# Patient Record
Sex: MALE | Race: WHITE | Employment: FULL TIME | URBAN - METROPOLITAN AREA
[De-identification: names, ages, dates, MRNs, and addresses within clinical notes are randomized per-mention and may not be internally consistent; named-entity substitution may affect disease eponyms.]

---

## 2019-01-28 ENCOUNTER — HOSPITAL ENCOUNTER (EMERGENCY)
Age: 39
Discharge: HOME | End: 2019-01-28
Attending: EMERGENCY MEDICINE
Payer: MEDICARE

## 2019-01-28 ENCOUNTER — APPOINTMENT (OUTPATIENT)
Dept: ULTRASOUND IMAGING | Age: 39
End: 2019-01-28
Payer: MEDICARE

## 2019-01-28 VITALS
RESPIRATION RATE: 16 BRPM | HEIGHT: 65 IN | BODY MASS INDEX: 24.99 KG/M2 | OXYGEN SATURATION: 100 % | TEMPERATURE: 98.7 F | SYSTOLIC BLOOD PRESSURE: 128 MMHG | HEART RATE: 78 BPM | DIASTOLIC BLOOD PRESSURE: 80 MMHG | WEIGHT: 150 LBS

## 2019-01-28 DIAGNOSIS — K40.90 RIGHT INGUINAL HERNIA: Primary | ICD-10-CM

## 2019-01-28 LAB
BILIRUBIN URINE: NEGATIVE
BLOOD, URINE: NEGATIVE
CLARITY: CLEAR
COLOR: NORMAL
GLUCOSE URINE: NEGATIVE MG/DL
KETONES, URINE: NEGATIVE MG/DL
LEUKOCYTE ESTERASE, URINE: NEGATIVE
MICROSCOPIC EXAMINATION: NORMAL
NITRITE, URINE: NEGATIVE
PH UA: 7.5
PROTEIN UA: NEGATIVE MG/DL
SPECIFIC GRAVITY UA: 1.01
URINE REFLEX TO CULTURE: NORMAL
URINE TYPE: NORMAL
UROBILINOGEN, URINE: 0.2 E.U./DL

## 2019-01-28 PROCEDURE — 99284 EMERGENCY DEPT VISIT MOD MDM: CPT

## 2019-01-28 PROCEDURE — 76870 US EXAM SCROTUM: CPT

## 2019-01-28 PROCEDURE — 81003 URINALYSIS AUTO W/O SCOPE: CPT

## 2019-01-28 ASSESSMENT — PAIN SCALES - GENERAL: PAINLEVEL_OUTOF10: 3

## 2019-01-28 ASSESSMENT — PAIN DESCRIPTION - ORIENTATION: ORIENTATION: RIGHT

## 2019-01-28 ASSESSMENT — PAIN DESCRIPTION - PAIN TYPE: TYPE: ACUTE PAIN

## 2019-01-28 ASSESSMENT — PAIN DESCRIPTION - LOCATION: LOCATION: GROIN

## 2019-01-31 ASSESSMENT — ENCOUNTER SYMPTOMS
EYE REDNESS: 0
RHINORRHEA: 0
SHORTNESS OF BREATH: 0
BACK PAIN: 0
ABDOMINAL PAIN: 0
DIARRHEA: 0
EYE PAIN: 0
NAUSEA: 0
CONSTIPATION: 0
SORE THROAT: 0
COUGH: 0
FACIAL SWELLING: 0
CHEST TIGHTNESS: 0

## 2019-02-05 ENCOUNTER — INITIAL CONSULT (OUTPATIENT)
Dept: SURGERY | Age: 39
End: 2019-02-05
Payer: MEDICAID

## 2019-02-05 VITALS
SYSTOLIC BLOOD PRESSURE: 118 MMHG | BODY MASS INDEX: 27.82 KG/M2 | WEIGHT: 167 LBS | DIASTOLIC BLOOD PRESSURE: 70 MMHG | HEIGHT: 65 IN

## 2019-02-05 DIAGNOSIS — R10.31 RIGHT GROIN PAIN: Primary | ICD-10-CM

## 2019-02-05 PROCEDURE — 99242 OFF/OP CONSLTJ NEW/EST SF 20: CPT | Performed by: SURGERY

## 2021-01-27 ENCOUNTER — HOSPITAL ENCOUNTER (EMERGENCY)
Age: 41
Discharge: HOME | End: 2021-01-27
Payer: COMMERCIAL

## 2021-01-27 ENCOUNTER — APPOINTMENT (OUTPATIENT)
Dept: GENERAL RADIOLOGY | Age: 41
End: 2021-01-27
Payer: COMMERCIAL

## 2021-01-27 VITALS
SYSTOLIC BLOOD PRESSURE: 122 MMHG | OXYGEN SATURATION: 98 % | BODY MASS INDEX: 27.82 KG/M2 | HEIGHT: 65 IN | TEMPERATURE: 98 F | WEIGHT: 167 LBS | DIASTOLIC BLOOD PRESSURE: 70 MMHG | RESPIRATION RATE: 18 BRPM | HEART RATE: 77 BPM

## 2021-01-27 DIAGNOSIS — R07.9 CHEST PAIN, UNSPECIFIED TYPE: Primary | ICD-10-CM

## 2021-01-27 LAB
A/G RATIO: 1.3 (ref 1.1–2.2)
ALBUMIN SERPL-MCNC: 4.4 G/DL (ref 3.4–5)
ALP BLD-CCNC: 117 U/L (ref 40–129)
ALT SERPL-CCNC: 57 U/L (ref 10–40)
ANION GAP SERPL CALCULATED.3IONS-SCNC: 9 MMOL/L (ref 3–16)
AST SERPL-CCNC: 37 U/L (ref 15–37)
BASOPHILS ABSOLUTE: 0.1 K/UL (ref 0–0.2)
BASOPHILS RELATIVE PERCENT: 1.7 %
BILIRUB SERPL-MCNC: <0.2 MG/DL (ref 0–1)
BUN BLDV-MCNC: 22 MG/DL (ref 7–20)
CALCIUM SERPL-MCNC: 9.3 MG/DL (ref 8.3–10.6)
CHLORIDE BLD-SCNC: 100 MMOL/L (ref 99–110)
CO2: 26 MMOL/L (ref 21–32)
CREAT SERPL-MCNC: 0.6 MG/DL (ref 0.9–1.3)
EKG ATRIAL RATE: 67 BPM
EKG DIAGNOSIS: NORMAL
EKG P AXIS: 17 DEGREES
EKG P-R INTERVAL: 140 MS
EKG Q-T INTERVAL: 408 MS
EKG QRS DURATION: 92 MS
EKG QTC CALCULATION (BAZETT): 431 MS
EKG R AXIS: 19 DEGREES
EKG T AXIS: 9 DEGREES
EKG VENTRICULAR RATE: 67 BPM
EOSINOPHILS ABSOLUTE: 0.2 K/UL (ref 0–0.6)
EOSINOPHILS RELATIVE PERCENT: 3.3 %
GFR AFRICAN AMERICAN: >60
GFR NON-AFRICAN AMERICAN: >60
GLOBULIN: 3.3 G/DL
GLUCOSE BLD-MCNC: 94 MG/DL (ref 70–99)
HCT VFR BLD CALC: 44.9 % (ref 40.5–52.5)
HEMOGLOBIN: 15.1 G/DL (ref 13.5–17.5)
LACTIC ACID: 1.1 MMOL/L (ref 0.4–2)
LYMPHOCYTES ABSOLUTE: 1.7 K/UL (ref 1–5.1)
LYMPHOCYTES RELATIVE PERCENT: 27 %
MCH RBC QN AUTO: 30.5 PG (ref 26–34)
MCHC RBC AUTO-ENTMCNC: 33.7 G/DL (ref 31–36)
MCV RBC AUTO: 90.6 FL (ref 80–100)
MONOCYTES ABSOLUTE: 0.4 K/UL (ref 0–1.3)
MONOCYTES RELATIVE PERCENT: 6.4 %
NEUTROPHILS ABSOLUTE: 3.9 K/UL (ref 1.7–7.7)
NEUTROPHILS RELATIVE PERCENT: 61.6 %
PDW BLD-RTO: 12.8 % (ref 12.4–15.4)
PLATELET # BLD: 270 K/UL (ref 135–450)
PMV BLD AUTO: 8 FL (ref 5–10.5)
POTASSIUM SERPL-SCNC: 3.9 MMOL/L (ref 3.5–5.1)
RBC # BLD: 4.96 M/UL (ref 4.2–5.9)
SODIUM BLD-SCNC: 135 MMOL/L (ref 136–145)
TOTAL PROTEIN: 7.7 G/DL (ref 6.4–8.2)
TROPONIN: <0.01 NG/ML
WBC # BLD: 6.3 K/UL (ref 4–11)

## 2021-01-27 PROCEDURE — 93005 ELECTROCARDIOGRAM TRACING: CPT | Performed by: EMERGENCY MEDICINE

## 2021-01-27 PROCEDURE — 85025 COMPLETE CBC W/AUTO DIFF WBC: CPT

## 2021-01-27 PROCEDURE — 83605 ASSAY OF LACTIC ACID: CPT

## 2021-01-27 PROCEDURE — 71045 X-RAY EXAM CHEST 1 VIEW: CPT

## 2021-01-27 PROCEDURE — 99285 EMERGENCY DEPT VISIT HI MDM: CPT

## 2021-01-27 PROCEDURE — 84484 ASSAY OF TROPONIN QUANT: CPT

## 2021-01-27 PROCEDURE — 93010 ELECTROCARDIOGRAM REPORT: CPT | Performed by: INTERNAL MEDICINE

## 2021-01-27 PROCEDURE — 6370000000 HC RX 637 (ALT 250 FOR IP): Performed by: NURSE PRACTITIONER

## 2021-01-27 PROCEDURE — 80053 COMPREHEN METABOLIC PANEL: CPT

## 2021-01-27 PROCEDURE — 94640 AIRWAY INHALATION TREATMENT: CPT

## 2021-01-27 RX ORDER — DICYCLOMINE HYDROCHLORIDE 10 MG/1
CAPSULE ORAL PRN
COMMUNITY
Start: 2021-01-22

## 2021-01-27 RX ORDER — DICLOFENAC SODIUM 75 MG/1
TABLET, DELAYED RELEASE ORAL DAILY
COMMUNITY
Start: 2021-01-22

## 2021-01-27 RX ORDER — ASPIRIN 81 MG/1
324 TABLET, CHEWABLE ORAL ONCE
Status: COMPLETED | OUTPATIENT
Start: 2021-01-27 | End: 2021-01-27

## 2021-01-27 RX ORDER — NITROGLYCERIN 0.4 MG/1
0.4 TABLET SUBLINGUAL EVERY 5 MIN PRN
Status: DISCONTINUED | OUTPATIENT
Start: 2021-01-27 | End: 2021-01-27 | Stop reason: HOSPADM

## 2021-01-27 RX ORDER — ALBUTEROL SULFATE 90 UG/1
2 AEROSOL, METERED RESPIRATORY (INHALATION) 4 TIMES DAILY PRN
Qty: 1 INHALER | Refills: 0 | Status: SHIPPED | OUTPATIENT
Start: 2021-01-27

## 2021-01-27 RX ORDER — IPRATROPIUM BROMIDE AND ALBUTEROL SULFATE 2.5; .5 MG/3ML; MG/3ML
1 SOLUTION RESPIRATORY (INHALATION) ONCE
Status: COMPLETED | OUTPATIENT
Start: 2021-01-27 | End: 2021-01-27

## 2021-01-27 RX ADMIN — ASPIRIN 324 MG: 81 TABLET, CHEWABLE ORAL at 13:03

## 2021-01-27 RX ADMIN — IPRATROPIUM BROMIDE AND ALBUTEROL SULFATE 1 AMPULE: .5; 3 SOLUTION RESPIRATORY (INHALATION) at 13:59

## 2021-01-27 ASSESSMENT — PAIN DESCRIPTION - PROGRESSION: CLINICAL_PROGRESSION: NOT CHANGED

## 2021-01-27 ASSESSMENT — PAIN SCALES - GENERAL
PAINLEVEL_OUTOF10: 4
PAINLEVEL_OUTOF10: 4

## 2021-01-27 ASSESSMENT — PAIN DESCRIPTION - DESCRIPTORS: DESCRIPTORS: SQUEEZING

## 2021-01-27 ASSESSMENT — HEART SCORE: ECG: 1

## 2021-01-27 ASSESSMENT — PAIN DESCRIPTION - LOCATION: LOCATION: CHEST

## 2021-01-27 NOTE — ED PROVIDER NOTES
I was not asked to see this patient. I was available for consultation if deemed necessary. I was only asked to interpret the EKG. Please see NP Ke's note for care of the patient while in the emergency department and for final disposition. The Ekg interpreted by me shows  normal sinus rhythm with a rate of 67  Axis is   Normal  QTc is  normal  Intervals and Durations are unremarkable.       ST Segments: nonspecific changes  No significant change from prior EKG dated - no old EKG  No STEMI             [address], MD  01/27/21 1496

## 2021-01-27 NOTE — ED NOTES
Pt ambulatory to restroom at this time. Pt's gait even and steady.      Teena Camacho RN  01/27/21 9529

## 2021-01-27 NOTE — ED PROVIDER NOTES
[facility] Emergency Department    CHIEF COMPLAINT  Chest Pain (patient reporting chest pain x 2 weeks. pt reports he was seen at [facility] and dx with a viral infection (NEGATIVE COVID) pt reports he was told to go to the ED if the chest pain continued. ) and Shortness of [address] is a 47 y.o. male who presents to the ED complaining of chest pain he describes as tightness for the past week. Patient reports he also has associated shortness of breath. Patient was diagnosed with COVID-19 in the beginning of January. Patient denies measurable fever, chills, body aches, cough, mopped assist, dizziness, syncope, abdominal pain, nausea, vomiting, diarrhea, urinary complaints, flank pain, leg swelling, calf pain, palpitations. No other complaints, modifying factors or associated symptoms. Nursing notes reviewed. History reviewed. No pertinent past medical history. Past Surgical History:   Procedure Laterality Date    HERNIA REPAIR       History reviewed. No pertinent family history. Social History     Socioeconomic History    Marital status: Single     Spouse name: Not on file    Number of children: Not on file    Years of education: Not on file    Highest education level: Not on file   Occupational History    Not on file   Social Needs    Financial resource strain: Not on file    Food insecurity     Worry: Not on file     Inability: Not on file    Transportation needs     Medical: Not on file     Non-medical: Not on file   Tobacco Use    Smoking status: Never Smoker    Smokeless tobacco: Current User     Types: Snuff   Substance and Sexual Activity    Alcohol use:  Yes     Alcohol/week: 4.0 standard drinks     Types: 4 Cans of beer per week     Comment: daily     Drug use: No    Sexual activity: Not on file   Lifestyle    Physical activity     Days per week: Not on file     Minutes per session: Not on file    Stress: Not on file   Relationships    Social connections     Talks on phone: Not on file     Gets together: Not on file     Attends Sabianist service: Not on file     Active member of club or organization: Not on file     Attends meetings of clubs or organizations: Not on file     Relationship status: Not on file    Intimate partner violence     Fear of current or ex partner: Not on file     Emotionally abused: Not on file     Physically abused: Not on file     Forced sexual activity: Not on file   Other Topics Concern    Not on file   Social History Narrative    Not on file     Current Facility-Administered Medications   Medication Dose Route Frequency Provider Last Rate Last Admin    nitroGLYCERIN (NITROSTAT) SL tablet 0.4 mg  0.4 mg Sublingual Q5 Min PRN Simmie Chew, APRN - CNP         Current Outpatient Medications   Medication Sig Dispense Refill    albuterol sulfate HFA (VENTOLIN HFA) 108 (90 Base) MCG/ACT inhaler Inhale 2 puffs into the lungs 4 times daily as needed for Wheezing 1 Inhaler 0    dicyclomine (BENTYL) 10 MG capsule       diclofenac (VOLTAREN) 75 MG EC tablet        Allergies   Allergen Reactions    Amoxicillin Anaphylaxis    Penicillins Anaphylaxis       REVIEW OF SYSTEMS  10 systems reviewed, pertinent positives per HPI otherwise noted to be negative    PHYSICAL EXAM  /70   Pulse 77   Temp 98 °F (36.7 °C) (Oral)   Resp 18   Ht 5' 5\" (1.651 m)   Wt 167 lb (75.8 kg)   SpO2 98%   BMI 27.79 kg/m²   GENERAL APPEARANCE: Awake and alert. Cooperative. No acute distress. Vital signs are stable. Well appearing and non toxic. HEAD: Normocephalic. Atraumatic. EYES: PERRL. EOM's grossly intact. ENT: Mucous membranes are moist.   NECK: Supple. Normal ROM. HEART: RRR. Distal pulses are equal and intact. Cap refill less than 2 seconds. LUNGS: Respirations unlabored. CTAB. Good air exchange. Speaking comfortably in full sentences.  No wheezing, rhonchi, rales, stridor. ABDOMEN: Soft. Non-distended. Non-tender. No guarding or rebound. No masses. No organomegaly. No rigidity. Bowel sounds are present. Negative lyons's. Negative McBurney's point. Negative CVA tenderness. EXTREMITIES: No peripheral edema. Moves all extremities equally. All extremities neurovascularly intact. SKIN: Warm and dry. No acute rashes. NEUROLOGICAL: Alert and oriented. No gross facial drooping. Strength 5/5, sensation intact. PSYCHIATRIC: Normal mood and affect. SCREENINGS        Heart Score  Heart Score for chest pain patients  History: Slightly Suspicious  ECG: Non-Specifc repolarization disturbance/LBTB/PM  Patient Age: < 45 years  *Risk factors for Atherosclerotic disease: Positive family History  Risk Factors: 1 or 2 risk factors  Troponin: < 1X normal limit  Heart Score Total: 2     PERC Rule:  Applicable in this patient who has low clinical suspicion for pulmonary embolism. Age < 48years old: Yes  Heart rate < 100 bpm: Yes  Oxygen saturation > 95%: Yes  Hemoptysis: No  Exogenous estrogen use: No  Prior history of DVT or PE: No  Unilateral leg swelling: No  Surgery or significant trauma in the past 4 weeks: No    Based on the above, PE can effectively be ruled out without further testing. RADIOLOGY  Xr Chest Portable    Result Date: 1/27/2021  EXAMINATION: ONE X-RAY VIEW OF THE CHEST 1/27/2021 12:05 pm COMPARISON: None HISTORY: ORDERING SYSTEM PROVIDED HISTORY: QUINCY MILLER TECHNOLOGIST PROVIDED HISTORY: Reason for exam:   PAUL, QUINCY FINDINGS: The cardiomediastinal silhouette is normal in size. Lung volumes are low. The lungs are clear. No pleural effusion or pneumothorax is present. Low lung volumes without evidence of acute cardiopulmonary process. ED COURSE/MDM  Patient seen and evaluated. Old records reviewed. Diagnostic testing reviewed and results discussed. I have independently evaluated this patient based upon my scope of practice.  Supervising physician was in the department for consultation as needed. Francine Ellington presented to the ED today with above noted complaints. Emergency department course is unremarkable chest x-ray shows low lung volumes no evidence of acute cardiopulmonary process. Troponin less than 0.01. CBC and CMP show no leukocytosis, bandemia, anemia, acute kidney injury, electrolyte abnormality. Lactic acid 1.1. EKG was interpreted by my attending physician, no ST elevation. Patient was given a DuoNeb and aspirin with good relief of chest tightness. While in ED patient received   Medications   nitroGLYCERIN (NITROSTAT) SL tablet 0.4 mg (0.4 mg Sublingual Not Given 1/27/21 1450)   aspirin chewable tablet 324 mg (324 mg Oral Given 1/27/21 1303)   ipratropium-albuterol (DUONEB) nebulizer solution 1 ampule (1 ampule Inhalation Given 1/27/21 1359)             At this point I do not feel the patient requires further work up and it is reasonable to discharge the patient. A discussion was had with the patient and/or their surrogate regarding diagnosis, diagnostic testing results, treatment/ plan of care, and follow up. There was shared decision-making between myself as well as the patient and/or their surrogate and we are all in agreement with discharge home. There was an opportunity for questions and all questions were answered to the best of my ability and to the satisfaction of the patient and/or patient family. Patient will follow up with pcp for further evaluation/treatment. The patient was given strict return precautions as we discussed symptoms that would necessitate return to the ED. Patient will return to ED for new/worsening symptoms. The patient verbalized their understanding and agreement with the above plan. Please refer to AVS for further details regarding discharge instructions.       Results for orders placed or performed during the hospital encounter of 01/27/21   CBC auto differential   Result THORACIC AORTIC DISSECTION, thus I consider the discharge disposition reasonable. Radha Rubalcava and I have discussed the diagnosis and risks, and we agree with discharging home to follow-up with their primary doctor. We also discussed returning to the Emergency Department immediately if new or worsening symptoms occur. We have discussed the symptoms which are most concerning (e.g., bloody sputum, fever, worsening pain or shortness of breath, vomiting) that necessitate immediate return. FINAL Impression    1. Chest pain, unspecified type        Blood pressure 122/70, pulse 77, temperature 98 °F (36.7 °C), temperature source Oral, resp. rate 18, height 5' 5\" (1.651 m), weight 167 lb (75.8 kg), SpO2 98 %. Patient was sent home with a prescription for below medication/s. I did [facility] [place] patient on appropriate use of these medication. Discharge Medication List as of 1/27/2021  2:40 PM      START taking these medications    Details   albuterol sulfate HFA (VENTOLIN HFA) 108 (90 Base) MCG/ACT inhaler Inhale 2 puffs into the lungs 4 times daily as needed for Wheezing, Disp-1 Inhaler, R-0Normal                 FOLLOW UP  [facility]  [facility] [address] Pre[facility]Services  [address]  ED  [address]          DISPOSITION  Patient was discharged to home in good condition. Comment: Please note this report has been produced using speech recognition software and may contain errors related to that system including errors in grammar, punctuation, and spelling, as well as words and phrases that may be inappropriate. If there are any questions or concerns please feel free to contact the dictating provider for clarification.             Rosi Gupta, APRN - CNP  01/27/21 7047

## 2021-01-27 NOTE — ED NOTES
Educated pt on x1 prescriptions and discharge paperwork as well as follow-up appointment. Pt verbalizes understanding of all instructions and denies questions. IV discontinued, catheter intact, minimal bleeding at IV site, 2x2 and tape applied, manual pressure held. Pt left ambulatory by self with all personal belongings, and discharge paperwork to private residence. Pt in no distress at this time.        Cody Ramos RN  01/27/21 0643

## 2021-01-29 ENCOUNTER — TELEPHONE (OUTPATIENT)
Dept: CARDIOLOGY CLINIC | Age: 41
End: 2021-01-29

## 2021-01-29 ENCOUNTER — OFFICE VISIT (OUTPATIENT)
Dept: CARDIOLOGY CLINIC | Age: 41
End: 2021-01-29
Payer: COMMERCIAL

## 2021-01-29 VITALS
HEART RATE: 75 BPM | BODY MASS INDEX: 27.16 KG/M2 | SYSTOLIC BLOOD PRESSURE: 110 MMHG | WEIGHT: 169 LBS | OXYGEN SATURATION: 98 % | HEIGHT: 66 IN | DIASTOLIC BLOOD PRESSURE: 60 MMHG

## 2021-01-29 DIAGNOSIS — R53.83 FATIGUE, UNSPECIFIED TYPE: ICD-10-CM

## 2021-01-29 DIAGNOSIS — R00.2 PALPITATIONS: ICD-10-CM

## 2021-01-29 DIAGNOSIS — R07.89 ATYPICAL CHEST PAIN: Primary | ICD-10-CM

## 2021-01-29 DIAGNOSIS — U07.1 COVID-19: ICD-10-CM

## 2021-01-29 DIAGNOSIS — Z87.891 FORMER TOBACCO USE: ICD-10-CM

## 2021-01-29 DIAGNOSIS — R06.00 DYSPNEA, UNSPECIFIED TYPE: ICD-10-CM

## 2021-01-29 PROBLEM — R07.2 PRECORDIAL PAIN: Status: ACTIVE | Noted: 2021-01-29

## 2021-01-29 PROBLEM — R06.02 SHORTNESS OF BREATH: Status: ACTIVE | Noted: 2021-01-29

## 2021-01-29 PROCEDURE — 99244 OFF/OP CNSLTJ NEW/EST MOD 40: CPT | Performed by: INTERNAL MEDICINE

## 2021-01-29 PROCEDURE — 93246 EXT ECG>7D<15D RECORDING: CPT | Performed by: INTERNAL MEDICINE

## 2021-01-29 ASSESSMENT — ENCOUNTER SYMPTOMS
EYE PAIN: 0
RHINORRHEA: 0
BLOOD IN STOOL: 0
COUGH: 1
SORE THROAT: 0
NAUSEA: 1
SHORTNESS OF BREATH: 1
CONSTIPATION: 0
VOMITING: 0
DIARRHEA: 1
PHOTOPHOBIA: 0
ABDOMINAL PAIN: 0

## 2021-01-29 NOTE — PATIENT INSTRUCTIONS
Patient Plan:  1. Check TSH, magnesium, lipids, Hemoglobin A1C   2. Echocardiogram to evaluate heart function and structure. 3. Stress echocardiogram.   4. 2 week cardiac event monitor to visualize your heart rhythm associated to your palpitations. 5. Follow up with me in 1 months. Your provider has ordered testing for further evaluation. An order/prescription has been included in your paper work. ? To schedule outpatient testing, contact Central Scheduling by calling [address] [place] [address] ([phone]).

## 2021-01-29 NOTE — TELEPHONE ENCOUNTER
Pt sts he trying to get short term disability. Pt was told by insurance company that he had to reach out to our office to get records released to them [facility]. Pt wants to know what he needs to get records released.

## 2021-01-29 NOTE — LETTER
[address] Cardiology - [address] [facility] [address] [address]  Phone: [phone]  Fax: [phone]    Rainer Abdi DO        January 29, 2021     Mami Acosta  [address] [facility] [address].      To whom it may concern,     Mami Acosta 1980 was recently evaluated in the emergency room and is now being evaluated in our cardiologist office. It is recommended that he remain off of work until  2/5/2021 to further recover. If you have any questions or concerns, please don't hesitate to call.     Sincerely,        Chase Hernandez, DO

## 2021-01-29 NOTE — PROGRESS NOTES
[address] [facility]   Cardiovascular Evaluation    PATIENT: Rosanne Spangler  DATE: 2021  MRN: <[id number]>  CSN: [id number]  : 1980      Primary Care Doctor: No primary care provider on file. Reason for evaluation:   Chest pain, shortness of breath, palpitations    Subjective:   History of present illness on initial date of evaluation:  Dana ViviennemanOlga Godron is a 45-year-old male with a history of tobacco use referred for further evaluation of chest pain and shortness of breath. The patient reports that he developed fatigue, shortness of breath, and a productive cough around [place] and tested positive for COVID-19 on 20. A couple weeks after testing positive, he starting having substernal chest pressure which he says has been constant. The pressure seems to get worse when he exerts himself and is also associated with shortness of breath. He also notices that it seems to become more severe when he coughs or when he lays on his right side. He does not notice a difference when he lays flat on his back or leans forward. He says that he continues to feel weak since being diagnosed with COVID and gets easily fatigued. He has had some intermittent diarrhea with nausea and dry heaving. He has been having chills, but no fevers. He has also had some mild dizziness with standing and has had episodes where he feels like his heart is racing and beating irregularly. His palpitations have been occurring 5-6 times per day. He denies any syncope, lower extremity edema, or orthopnea. He was seen in the ED for chest pain two days ago. At that time, troponin was negative and ECG showed normal sinus rhythm with RSR' pattern in lead V1, suggestive of possible right ventricular conduction delay, but otherwise no ischemic changes. Chest x-ray was negative for any acute cardiopulmonary process. He as subsequently discharged home, but has continued to have symptoms. Additionally, the patient reports that he smoked 1-1.5 ppd for 7 years and quit about 16 years ago. He drinks alcohol socially, but denies any illicit drug use. He does admit to drinking a lot of caffeine and says that he consumes 3-4 cans of pop per day. Family history is notable for CAD in his father who had a 4-vessel CABG in his 46s. His paternal grandfather  of an MI at age 79. He also has a paternal uncle how had an MI in his 45s. Patient Active Problem List   Diagnosis    Precordial pain    Shortness of breath    Palpitations       Past Medical History:  1. Tobacco use    Surgical History:  No significant surgical history. Social History:   reports that he has never smoked. His smokeless tobacco use includes snuff. He reports current alcohol use of about 4.0 standard drinks of alcohol per week. He reports that he does not use drugs. Family History:  Father - CAD, 4-vessel CABG in his 46s  Paternal grandfather - CAD,  of MI at age 79  Paternal uncle - CAD, had MI in 45s    Home Medications:  Reviewed and are listed in nursing record. and/or listed below  Current Outpatient Medications   Medication Sig Dispense Refill    dicyclomine (BENTYL) 10 MG capsule Take by mouth as needed       diclofenac (VOLTAREN) 75 MG EC tablet Take by mouth daily       albuterol sulfate HFA (VENTOLIN HFA) 108 (90 Base) MCG/ACT inhaler Inhale 2 puffs into the lungs 4 times daily as needed for Wheezing 1 Inhaler 0     No current facility-administered medications for this visit. Allergies:  Amoxicillin and Penicillins     Review of Systems:   A 14 point review of symptoms completed. Pertinent positives identified in the HPI, all other review of symptoms negative as below. Review of Systems   Constitutional: Positive for chills and fatigue. Negative for fever. HENT: Negative for congestion, rhinorrhea and sore throat. Eyes: Positive for visual disturbance. Negative for photophobia and pain. Respiratory: Positive for cough and shortness of breath. Cardiovascular: Positive for chest pain and palpitations. Negative for leg swelling. Gastrointestinal: Positive for diarrhea and nausea. Negative for abdominal pain, blood in stool, constipation and vomiting. Endocrine: Negative for cold intolerance and heat intolerance. Genitourinary: Negative for difficulty urinating, dysuria and hematuria. Musculoskeletal: Negative for arthralgias, joint swelling and myalgias. Skin: Negative for rash and wound. Allergic/Immunologic: Negative for environmental allergies and food allergies. Neurological: Positive for dizziness, weakness and numbness. Negative for syncope. Hematological: Negative for adenopathy. Does not bruise/bleed easily. Psychiatric/Behavioral: Negative for dysphoric mood. The patient is not nervous/anxious. Objective:   PHYSICAL EXAM:    Vitals:    01/29/21 1102   BP: 110/60   Pulse: 75   SpO2: 98%    Weight: 169 lb (76.7 kg)     Wt Readings from Last 3 Encounters:   01/29/21 169 lb (76.7 kg)   01/27/21 167 lb (75.8 kg)   02/05/19 167 lb (75.8 kg)     General: Adult male in no acute distress. Pleasant and interactive on exam.  HEENT: Normocephalic, atraumatic, non-icteric, hearing intact, nares normal, mucous membranes moist.  Neck: Supple, trachea midline. No adenopathy. No thyromegaly. No carotid bruits. No JVD. Heart: Regular rate and rhythm. Normal S1 and S2. No murmurs, gallops or rubs. Lungs: Normal respiratory effort. Clear to auscultation bilaterally. No wheezes, rales, or rhonchi. Abdomen: Soft, non-tender. Normoactive bowel sounds. No masses or organomegaly. Skin: No rashes, wounds, or lesions. Pulses: 2+ and symmetric. Extremities: No clubbing, cyanosis, or edema. Musculoskeletal: 5/5 strength in upper and lower extremities. Psych: Normal mood and affect. Neuro: Alert and oriented to person, place, and time. No focal deficits noted. LABS   CBC:      Lab Results   Component Value Date    WBC 6.3 01/27/2021    RBC 4.96 01/27/2021    HGB 15.1 01/27/2021    HCT 44.9 01/27/2021    MCV 90.6 01/27/2021    RDW 12.8 01/27/2021     01/27/2021     CMP:  Lab Results   Component Value Date     01/27/2021    K 3.9 01/27/2021     01/27/2021    CO2 26 01/27/2021    BUN 22 01/27/2021    CREATININE 0.6 01/27/2021    GFRAA >60 01/27/2021    AGRATIO 1.3 01/27/2021    LABGLOM >60 01/27/2021    GLUCOSE 94 01/27/2021    PROT 7.7 01/27/2021    CALCIUM 9.3 01/27/2021    BILITOT <0.2 01/27/2021    ALKPHOS 117 01/27/2021    AST 37 01/27/2021    ALT 57 01/27/2021     PT/INR:   No results found for: PTINR  Liver:  No components found for: CHLPL  Lab Results   Component Value Date    ALT 57 (H) 01/27/2021    AST 37 01/27/2021    ALKPHOS 117 01/27/2021    BILITOT <0.2 01/27/2021     No results found for: LABA1C  Lipids:       No results found for: TRIG       No results found for: HDL       No results found for: LDLCALC       No results found for: LABVLDL      CARDIAC DATA   EKG 1/27/2021:  Normal sinus rhythm, RSR' pattern in V1 suggestive of possible right ventricular conduction delay    ECHO: N/A    STRESS TEST: N/A    CARDIAC CATH: N/A    VASCULAR/OTHER IMAGING:  CXR 1/27/21:  Low lung volumes without evidence of acute cardiopulmonary process.         Assessment: 1. Atypical chest pain/dyspnea - Symptoms may be attributable to post-viral syndrome given recent COVID-19 infection. He is also potentially at risk for pericarditis and/or myocarditis. Currently, he does not meet clinical criteria for pericarditis in the absence of characteristic ECG changes or pericardial friction rub on exam, but will need to evaluate for possible effusion. He is not hypoxic and felt to be lower risk for PE at this time. Altogether, to his persistent symptoms and strong family history of CAD, will plan to obtain baseline TTE to evaluate cardiac structure/function and rule out effusion and then proceed with exercise stress echocardiogram to assess for ischemia. 2. Palpitations - May be experiencing PVCs, but cannot exclude possible tachyarrhythmias. Serum potassium was WNL on recent labs, but magnesium level was not checked. recent thyroid function studies. He does admit to relatively heavy caffeine use, which may be contributing as well. 3. Fatigue  4. H/o COVID-19 infection  5. Former tobacco use      Plan:     1. Will obtain TTE to evaluate cardiac structure/function and rule out effusion and then proceed with exercise stress echocardiogram to assess for ischemia. 2. Also arrange for 2-week cardiac event monitor to evaluate for possible tachyarrhythmias. 3. Check TSH, magnesium level, lipid panel, and hemoglobin A1C.  4. Encouraged to work on limiting caffeine use. 5. Additional encouraged to continue to abstain from using tobacco products. 6. Follow-up in one month and if above work-up is unrevealing, will consider ordering PFTs and non-contrast chest CT. This note was scribed in the presence of Espinoza Hernandez DO by Yesika Pearl RN. It is a pleasure to assist in the care of Garry Flores. Please call with any questions.       Mariam Viera, [address] [facility]  [phone] [facility]  [phone] [facility]

## 2021-01-29 NOTE — TELEPHONE ENCOUNTER
Monitor placed by [address] [place] [address]  Length of monitor 14 DAY  Monitor ordered by [facility]  Serial number [id number]  Kit ID [id number]  Activation successful prior to pt leaving office?  Yes

## 2021-02-01 ENCOUNTER — HOSPITAL ENCOUNTER (OUTPATIENT)
Age: 41
Discharge: HOME | End: 2021-02-01
Payer: COMMERCIAL

## 2021-02-01 DIAGNOSIS — R00.2 PALPITATIONS: ICD-10-CM

## 2021-02-01 DIAGNOSIS — R07.89 ATYPICAL CHEST PAIN: ICD-10-CM

## 2021-02-01 LAB
CHOLESTEROL, TOTAL: 276 MG/DL (ref 0–199)
HDLC SERPL-MCNC: 52 MG/DL (ref 40–60)
LDL CHOLESTEROL CALCULATED: 198 MG/DL
MAGNESIUM: 2.6 MG/DL (ref 1.8–2.4)
TRIGL SERPL-MCNC: 130 MG/DL (ref 0–150)
TSH REFLEX: 1 UIU/ML (ref 0.27–4.2)
VLDLC SERPL CALC-MCNC: 26 MG/DL

## 2021-02-01 PROCEDURE — 36415 COLL VENOUS BLD VENIPUNCTURE: CPT

## 2021-02-01 PROCEDURE — 80061 LIPID PANEL: CPT

## 2021-02-01 PROCEDURE — 84443 ASSAY THYROID STIM HORMONE: CPT

## 2021-02-01 PROCEDURE — 83036 HEMOGLOBIN GLYCOSYLATED A1C: CPT

## 2021-02-01 PROCEDURE — 83735 ASSAY OF MAGNESIUM: CPT

## 2021-02-02 ENCOUNTER — TELEPHONE (OUTPATIENT)
Dept: CARDIOLOGY CLINIC | Age: 41
End: 2021-02-02

## 2021-02-02 LAB
ESTIMATED AVERAGE GLUCOSE: 102.5 MG/DL
HBA1C MFR BLD: 5.2 %

## 2021-02-09 RX ORDER — ATORVASTATIN CALCIUM 40 MG/1
40 TABLET, FILM COATED ORAL DAILY
Qty: 30 TABLET | Refills: 5 | Status: SHIPPED | OUTPATIENT
Start: 2021-02-09

## 2021-02-18 ENCOUNTER — TELEPHONE (OUTPATIENT)
Dept: CARDIOLOGY CLINIC | Age: 41
End: 2021-02-18

## 2021-02-18 NOTE — TELEPHONE ENCOUNTER
He has been off work since 1/27/2021 and had his employer fax over short term disability forms for you to fill out. He states that they did not want him to go back while wearing monitor. He has still not returned to work. I am printing monitor for your review and placing with forms. His other testing is tomorrow. He states that he still has cough and congestion from COVID. I have placed all this on your desk in your folder.

## 2021-02-18 NOTE — LETTER
[address] [place] [address] Cardiology - [address]  Phone: [phone]  Fax: [phone]    Rick Shepard,         March 10, 2021      Audi Chavez 1980    We see that you have still not scheduled your echo and stress test.  Please call [phone] to schedule you tests.     Sincerely,    Juan David Hernandez, DO

## 2021-02-19 PROCEDURE — 93248 EXT ECG>7D<15D REV&INTERPJ: CPT | Performed by: INTERNAL MEDICINE

## 2021-02-19 NOTE — TELEPHONE ENCOUNTER
ROYAI Patient informed. He stated that he was too sick to get cardiac testing done today. I informed him to call his primary regarding his cough and congestion and if he needed to off further to have them advise him.

## 2021-02-19 NOTE — TELEPHONE ENCOUNTER
Attempted to call and VM not set up   Forms faxed he is to return to 2/19/21 and his monitor shows predominant SR  Rare PACs and PVCs

## 2021-02-22 DIAGNOSIS — R00.2 PALPITATIONS: ICD-10-CM

## 2021-02-25 NOTE — TELEPHONE ENCOUNTER
Yes, he can wait to have his echo stress test completed until his cough and congestion improve. If these symptoms are not improving, he should follow-up with his PCP for additional evaluation. Thanks.

## 2021-02-25 NOTE — TELEPHONE ENCOUNTER
Did you see this note? It is just an FYI unless you wanted something different. I looked in chart and he still has not gotten testing done.